# Patient Record
Sex: MALE | URBAN - METROPOLITAN AREA
[De-identification: names, ages, dates, MRNs, and addresses within clinical notes are randomized per-mention and may not be internally consistent; named-entity substitution may affect disease eponyms.]

---

## 2020-03-01 ENCOUNTER — HOSPITAL ENCOUNTER (INPATIENT)
Age: 20
LOS: 4 days | Discharge: HOME | DRG: 885 | End: 2020-03-05
Attending: PSYCHIATRY & NEUROLOGY | Admitting: PSYCHIATRY & NEUROLOGY
Payer: COMMERCIAL

## 2020-03-01 PROBLEM — F31.60 BIPOLAR AFFECTIVE DISORDER, CURRENT EPISODE MIXED (HCC): Status: ACTIVE | Noted: 2020-03-01

## 2020-03-01 PROBLEM — F23 ACUTE PSYCHOSIS (HCC): Status: ACTIVE | Noted: 2020-03-01

## 2020-03-01 PROCEDURE — 6370000000 HC RX 637 (ALT 250 FOR IP): Performed by: NURSE PRACTITIONER

## 2020-03-01 PROCEDURE — 6370000000 HC RX 637 (ALT 250 FOR IP): Performed by: PSYCHIATRY & NEUROLOGY

## 2020-03-01 PROCEDURE — 90792 PSYCH DIAG EVAL W/MED SRVCS: CPT | Performed by: NURSE PRACTITIONER

## 2020-03-01 PROCEDURE — 6360000002 HC RX W HCPCS: Performed by: NURSE PRACTITIONER

## 2020-03-01 PROCEDURE — 1240000000 HC EMOTIONAL WELLNESS R&B

## 2020-03-01 RX ORDER — TRAZODONE HYDROCHLORIDE 50 MG/1
50 TABLET ORAL NIGHTLY PRN
Status: DISCONTINUED | OUTPATIENT
Start: 2020-03-01 | End: 2020-03-05 | Stop reason: HOSPADM

## 2020-03-01 RX ORDER — HALOPERIDOL 5 MG/ML
5 INJECTION INTRAMUSCULAR EVERY 6 HOURS PRN
Status: DISCONTINUED | OUTPATIENT
Start: 2020-03-01 | End: 2020-03-02

## 2020-03-01 RX ORDER — MAGNESIUM HYDROXIDE/ALUMINUM HYDROXICE/SIMETHICONE 120; 1200; 1200 MG/30ML; MG/30ML; MG/30ML
30 SUSPENSION ORAL EVERY 6 HOURS PRN
Status: DISCONTINUED | OUTPATIENT
Start: 2020-03-01 | End: 2020-03-05 | Stop reason: HOSPADM

## 2020-03-01 RX ORDER — DIPHENHYDRAMINE HYDROCHLORIDE 50 MG/ML
25 INJECTION INTRAMUSCULAR; INTRAVENOUS EVERY 6 HOURS PRN
Status: DISCONTINUED | OUTPATIENT
Start: 2020-03-01 | End: 2020-03-02

## 2020-03-01 RX ORDER — HALOPERIDOL 10 MG/1
5 TABLET ORAL EVERY 6 HOURS PRN
Status: DISCONTINUED | OUTPATIENT
Start: 2020-03-01 | End: 2020-03-05 | Stop reason: HOSPADM

## 2020-03-01 RX ORDER — ARIPIPRAZOLE 5 MG/1
5 TABLET ORAL DAILY
Status: DISCONTINUED | OUTPATIENT
Start: 2020-03-01 | End: 2020-03-02

## 2020-03-01 RX ORDER — ACETAMINOPHEN 325 MG/1
650 TABLET ORAL EVERY 4 HOURS PRN
Status: DISCONTINUED | OUTPATIENT
Start: 2020-03-01 | End: 2020-03-05 | Stop reason: HOSPADM

## 2020-03-01 RX ORDER — BENZTROPINE MESYLATE 1 MG/ML
2 INJECTION INTRAMUSCULAR; INTRAVENOUS 2 TIMES DAILY PRN
Status: DISCONTINUED | OUTPATIENT
Start: 2020-03-01 | End: 2020-03-05 | Stop reason: HOSPADM

## 2020-03-01 RX ORDER — HYDROXYZINE HYDROCHLORIDE 10 MG/1
10 TABLET, FILM COATED ORAL 3 TIMES DAILY PRN
Status: DISCONTINUED | OUTPATIENT
Start: 2020-03-01 | End: 2020-03-05 | Stop reason: HOSPADM

## 2020-03-01 RX ORDER — NICOTINE 21 MG/24HR
1 PATCH, TRANSDERMAL 24 HOURS TRANSDERMAL DAILY
Status: DISCONTINUED | OUTPATIENT
Start: 2020-03-01 | End: 2020-03-01

## 2020-03-01 RX ADMIN — ARIPIPRAZOLE 5 MG: 5 TABLET ORAL at 10:57

## 2020-03-01 RX ADMIN — HYDROXYZINE HYDROCHLORIDE 10 MG: 10 TABLET ORAL at 21:07

## 2020-03-01 RX ADMIN — DIPHENHYDRAMINE HYDROCHLORIDE 25 MG: 50 INJECTION INTRAMUSCULAR; INTRAVENOUS at 21:06

## 2020-03-01 RX ADMIN — HALOPERIDOL LACTATE 5 MG: 5 INJECTION INTRAMUSCULAR at 21:06

## 2020-03-01 RX ADMIN — TRAZODONE HYDROCHLORIDE 50 MG: 50 TABLET ORAL at 21:07

## 2020-03-01 ASSESSMENT — SLEEP AND FATIGUE QUESTIONNAIRES
AVERAGE NUMBER OF SLEEP HOURS: 8
DO YOU HAVE DIFFICULTY SLEEPING: NO
DO YOU USE A SLEEP AID: NO

## 2020-03-01 ASSESSMENT — PATIENT HEALTH QUESTIONNAIRE - PHQ9: SUM OF ALL RESPONSES TO PHQ QUESTIONS 1-9: 8

## 2020-03-01 ASSESSMENT — LIFESTYLE VARIABLES
HISTORY_ALCOHOL_USE: NO
HISTORY_ALCOHOL_USE: NO

## 2020-03-01 ASSESSMENT — PAIN SCALES - GENERAL: PAINLEVEL_OUTOF10: 0

## 2020-03-01 NOTE — GROUP NOTE
Group Therapy Note    Date: 3/1/2020    Group Start Time: 0900  Group End Time: 0930  Group Topic: Community Meeting    [facility]      Patient declined to attend 0900 group at [employer] meeting/goal setting despite encouragement from staff. 1:1 talk time offered by staff as alternative to group session.       Signature:  Neeta Villalta

## 2020-03-02 PROCEDURE — 6370000000 HC RX 637 (ALT 250 FOR IP): Performed by: PSYCHIATRY & NEUROLOGY

## 2020-03-02 PROCEDURE — 6360000002 HC RX W HCPCS: Performed by: NURSE PRACTITIONER

## 2020-03-02 PROCEDURE — 99232 SBSQ HOSP IP/OBS MODERATE 35: CPT | Performed by: PSYCHIATRY & NEUROLOGY

## 2020-03-02 PROCEDURE — 1240000000 HC EMOTIONAL WELLNESS R&B

## 2020-03-02 PROCEDURE — 6370000000 HC RX 637 (ALT 250 FOR IP): Performed by: NURSE PRACTITIONER

## 2020-03-02 RX ORDER — LORAZEPAM 1 MG/1
1 TABLET ORAL 3 TIMES DAILY
Status: DISCONTINUED | OUTPATIENT
Start: 2020-03-02 | End: 2020-03-05 | Stop reason: HOSPADM

## 2020-03-02 RX ORDER — ARIPIPRAZOLE 10 MG/1
10 TABLET ORAL DAILY
Status: DISCONTINUED | OUTPATIENT
Start: 2020-03-03 | End: 2020-03-05 | Stop reason: HOSPADM

## 2020-03-02 RX ORDER — HALOPERIDOL 5 MG/ML
5 INJECTION INTRAMUSCULAR EVERY 6 HOURS PRN
Status: DISCONTINUED | OUTPATIENT
Start: 2020-03-02 | End: 2020-03-05 | Stop reason: HOSPADM

## 2020-03-02 RX ORDER — DIPHENHYDRAMINE HYDROCHLORIDE 50 MG/ML
25 INJECTION INTRAMUSCULAR; INTRAVENOUS EVERY 6 HOURS PRN
Status: DISCONTINUED | OUTPATIENT
Start: 2020-03-02 | End: 2020-03-05 | Stop reason: HOSPADM

## 2020-03-02 RX ADMIN — LORAZEPAM 1 MG: 1 TABLET ORAL at 21:18

## 2020-03-02 RX ADMIN — HYDROXYZINE HYDROCHLORIDE 10 MG: 10 TABLET ORAL at 21:18

## 2020-03-02 RX ADMIN — HALOPERIDOL 5 MG: 10 TABLET ORAL at 09:23

## 2020-03-02 RX ADMIN — LORAZEPAM 1 MG: 1 TABLET ORAL at 15:30

## 2020-03-02 RX ADMIN — ARIPIPRAZOLE 5 MG: 5 TABLET ORAL at 08:37

## 2020-03-02 RX ADMIN — HALOPERIDOL LACTATE 5 MG: 5 INJECTION INTRAMUSCULAR at 16:08

## 2020-03-02 RX ADMIN — DIPHENHYDRAMINE HYDROCHLORIDE 25 MG: 50 INJECTION INTRAMUSCULAR; INTRAVENOUS at 16:08

## 2020-03-02 RX ADMIN — TRAZODONE HYDROCHLORIDE 50 MG: 50 TABLET ORAL at 21:17

## 2020-03-02 ASSESSMENT — PAIN SCALES - GENERAL: PAINLEVEL_OUTOF10: 0

## 2020-03-02 NOTE — GROUP NOTE
Group Therapy Note    Date: 3/2/2020    Group Start Time: 1330  Group End Time: 0876  Group Topic: Recreational    [address], [place]        Group Therapy Note    Attendees: 7/15    patient refused to attend leisure awareness group at [phone] after encouragement from staff. 1:1 talk time provided as alternative to group session .            Signature:  Marlen [place]

## 2020-03-02 NOTE — GROUP NOTE
Group Therapy Note    Date: 3/1/2020    Group Start Time: 2100  Group End Time: 2130  Group Topic: Wrap-Up    [facility] JAYDE Farley        Group Therapy Note    Attendees: 11/14 patients for a \"time capsule\" activity goal group     Patient was in and out of group but did answer one question appropriately    Signature:  Tam Sandoval

## 2020-03-02 NOTE — GROUP NOTE
Group Therapy Note    Date: 3/2/2020    Group Start Time: 0900  Group End Time: 0920  Group Topic: Community Meeting    [address] [place] [address]        Group Therapy Note    Attendees: 10/16         Patient's Goal:  To develop a daily goal    Status After Intervention:  Improved    Participation Level:  Active Listener and Interactive    Participation Quality: Appropriate and Attentive      Speech:  normal      Thought Process/Content: Logical      Affective Functioning: Congruent      Mood: euphoric    Level of consciousness:  Alert and Oriented x4    Response to Learning: Able to verbalize current knowledge/experience and Able to verbalize/acknowledge new learning      Modes of Intervention: Education, Support and Socialization      Discipline Responsible: Psychoeducational Specialist      Signature:  Lilly Deshpande

## 2020-03-03 PROCEDURE — 6370000000 HC RX 637 (ALT 250 FOR IP): Performed by: PSYCHIATRY & NEUROLOGY

## 2020-03-03 PROCEDURE — 99232 SBSQ HOSP IP/OBS MODERATE 35: CPT | Performed by: PSYCHIATRY & NEUROLOGY

## 2020-03-03 PROCEDURE — 6360000002 HC RX W HCPCS: Performed by: NURSE PRACTITIONER

## 2020-03-03 PROCEDURE — 1240000000 HC EMOTIONAL WELLNESS R&B

## 2020-03-03 RX ORDER — PROPRANOLOL HYDROCHLORIDE 20 MG/1
20 TABLET ORAL 3 TIMES DAILY
Status: DISCONTINUED | OUTPATIENT
Start: 2020-03-03 | End: 2020-03-05 | Stop reason: HOSPADM

## 2020-03-03 RX ADMIN — PROPRANOLOL HYDROCHLORIDE 20 MG: 20 TABLET ORAL at 20:49

## 2020-03-03 RX ADMIN — LORAZEPAM 1 MG: 1 TABLET ORAL at 12:40

## 2020-03-03 RX ADMIN — PROPRANOLOL HYDROCHLORIDE 20 MG: 20 TABLET ORAL at 12:40

## 2020-03-03 RX ADMIN — LORAZEPAM 1 MG: 1 TABLET ORAL at 20:50

## 2020-03-03 RX ADMIN — BENZTROPINE MESYLATE 2 MG: 1 INJECTION INTRAMUSCULAR; INTRAVENOUS at 12:39

## 2020-03-03 RX ADMIN — ARIPIPRAZOLE 10 MG: 10 TABLET ORAL at 09:16

## 2020-03-03 RX ADMIN — TRAZODONE HYDROCHLORIDE 50 MG: 50 TABLET ORAL at 22:26

## 2020-03-03 RX ADMIN — LORAZEPAM 1 MG: 1 TABLET ORAL at 09:16

## 2020-03-03 RX ADMIN — NICOTINE POLACRILEX 2 MG: 2 GUM, CHEWING BUCCAL at 20:48

## 2020-03-03 NOTE — GROUP NOTE
Group Therapy Note    Date: 3/3/2020    Group Start Time: 1330  Group End Time: 1400  Group Topic: Cognitive Skills    [facility] MARVA Alberto        Group Therapy Note    Attendees: 5/15         Patient's Goal:  Developing a daily schedule and introducing new coping skills to maintain wellness    Status After Intervention:  Improved    Participation Level:  Active Listener and Interactive    Participation Quality: Appropriate and Attentive      Speech:  normal      Thought Process/Content: Logical      Affective Functioning: Congruent      Mood: euphoric      Level of consciousness:  Alert, Oriented x4 and Attentive      Response to Learning: Able to verbalize current knowledge/experience, Able to verbalize/acknowledge new learning and Able to retain information      Modes of Intervention: Education, Support and Socialization      Discipline Responsible: Psychoeducational Specialist      Signature:  Kip Coleman

## 2020-03-03 NOTE — GROUP NOTE
Group Therapy Note    Date: 3/3/2020    Group Start Time: 1600  Group End Time: 36  Group Topic: Healthy Living/Wellness    KAREN Nicholas        Group Therapy Note    Attendees: 8         Patient's Goal: Socialization          Status After Intervention:  Improved    Participation Level:  Active Listener    Participation Quality: Appropriate      Speech:  normal      Thought Process/Content: Logical      Affective Functioning: Flat      Mood: euthymic      Level of consciousness:  Alert and Oriented x4      Response to Learning: Able to verbalize current knowledge/experience      Endings: None Reported    Modes of Intervention: Socialization      Discipline Responsible: Behavorial Health Tech      Signature:  Jennifer Tang

## 2020-03-03 NOTE — PLAN OF CARE
Patient is alert and oriented, able to make needs known. Patient denies suicidal thoughts, denies thoughts of self harm or harm to others. Patient denies depression, admits to anxiety. Patient asking for something to help with anxiety and agitation. Patient appears to be preoccupied, slow to respond.  Patient is medication complaint
Problem: Altered Mood, Psychotic Behavior:  Goal: Able to verbalize decrease in frequency and intensity of hallucinations  Description  Able to verbalize decrease in frequency and intensity of hallucinations  3/3/2020 0025 by Rayray Eddy LPN  Outcome: Ongoing  Note:   Patient denies hallucinations at this time. Problem: Altered Mood, Psychotic Behavior:  Goal: Able to verbalize reality based thinking  Description  Able to verbalize reality based thinking  3/3/2020 0025 by Rayray Eddy LPN  Outcome: Ongoing  Note:   Patient verbalized reality based thinking.
Problem: Altered Mood, Psychotic Behavior:  Goal: Able to verbalize decrease in frequency and intensity of hallucinations  Description  Able to verbalize decrease in frequency and intensity of hallucinations  3/3/2020 1054 by Emile Moss RN  Outcome: Ongoing     Problem: Altered Mood, Psychotic Behavior:  Goal: Able to verbalize reality based thinking  Description  Able to verbalize reality based thinking  3/3/2020 1054 by Emile Moss RN  Outcome: Ongoing     Patient denies suicidal and homicidal ideations. Patient denies hallucinations, but appears to be responding to internal stimuli at times. Patient is compliant with medications and in behavioral control. Patient is cooperative and appropriate during assessment. Will continue safety checks per provider order.
Problem: Altered Mood, Psychotic Behavior:  Goal: Able to verbalize reality based thinking  Description  Able to verbalize reality based thinking  3/1/2020 2237 by Mathew Hidalgo RN  Outcome: Ongoing  Patient states they are not having thoughts of self harm at this time and verbally agrees to seek staff if feelings of harming self arise,patient reported anxiety racing thoughts,PRN meds given pt tolerated well. Patient behavior controlled and accepting of medications,denies audio hallucination and visual hallucinationsRestless,pacing,hyper verbal,poor sleep,patient eating well. Staff will continue to monitor for safety and offer support as needed.
Insight  Present Suicidal Ideation: No    EDUCATION:   Learner Progress Toward Treatment Goals: reviewed group plans and strategies for care    Method:group therapy, medication compliance, individualized assessments and care planning    Outcome: needs reinforcement    PATIENT GOALS: to be discussed with patient within 72 hours    PLAN/TREATMENT RECOMMENDATIONS:     continue group therapy , medications compliance, goal setting, individualized assessments and care, continue to monitor pt on unit      SHORT-TERM GOALS:   Time frame for Short-Term Goals: 5-7 days    LONG-TERM GOALS:  Time frame for Long-Term Goals: 6 months  Members Present in Team Meeting: See Signature [address]
[facility]

## 2020-03-03 NOTE — PROGRESS NOTES
Nightly PRN  hydrOXYzine (ATARAX) tablet 10 mg, 10 mg, Oral, TID PRN  haloperidol (HALDOL) tablet 5 mg, 5 mg, Oral, Q6H PRN  acetaminophen (TYLENOL) tablet 650 mg, 650 mg, Oral, Q4H PRN  benztropine mesylate (COGENTIN) injection 2 mg, 2 mg, Intramuscular, BID PRN  magnesium hydroxide (MILK OF MAGNESIA) 400 MG/5ML suspension 30 mL, 30 mL, Oral, Daily PRN  aluminum & magnesium hydroxide-simethicone (MAALOX) 200-200-20 MG/5ML suspension 30 mL, 30 mL, Oral, Q6H PRN    No results found for this or any previous visit (from the past 72 hour(s)). ASSESSMENT     Active Problems:    Bipolar affective disorder, current episode mixed ([facility] [address]  Resolved Problems:    * No resolved hospital problems. *      PLAN    · The patient has significant psychosis and during his ability to function outside  The hospital. the catatonic symptoms are most likely due to the psychosis as he was not on treatment prior to admission. .  · Increase Abilify to 10 mg daily. · Schedule Ativan 1 mg 3 times a day for the catatonic symptoms and severe anxiety. · Continue unit milieu and group psychotherapy.     Electronically Signed by Italia Hodge MD , 3/2/2020 8:42 PM

## 2020-03-03 NOTE — GROUP NOTE
Group Therapy Note    Date: 3/3/2020    Group Start Time: 2050  Group End Time: 2115  Group Topic: Recreational    [facility] ROSALBA Storm RN        Group Therapy Note    Attendees: 9/13         Patient attended PM recreational/wrap up group. Signature:   Star Storm RN

## 2020-03-04 PROCEDURE — 6370000000 HC RX 637 (ALT 250 FOR IP): Performed by: PSYCHIATRY & NEUROLOGY

## 2020-03-04 PROCEDURE — 1240000000 HC EMOTIONAL WELLNESS R&B

## 2020-03-04 PROCEDURE — 99232 SBSQ HOSP IP/OBS MODERATE 35: CPT | Performed by: PSYCHIATRY & NEUROLOGY

## 2020-03-04 RX ADMIN — NICOTINE POLACRILEX 2 MG: 2 GUM, CHEWING BUCCAL at 18:47

## 2020-03-04 RX ADMIN — LORAZEPAM 1 MG: 1 TABLET ORAL at 20:34

## 2020-03-04 RX ADMIN — ARIPIPRAZOLE 10 MG: 10 TABLET ORAL at 08:29

## 2020-03-04 RX ADMIN — PROPRANOLOL HYDROCHLORIDE 20 MG: 20 TABLET ORAL at 20:33

## 2020-03-04 RX ADMIN — LORAZEPAM 1 MG: 1 TABLET ORAL at 08:29

## 2020-03-04 RX ADMIN — LORAZEPAM 1 MG: 1 TABLET ORAL at 14:14

## 2020-03-04 RX ADMIN — TRAZODONE HYDROCHLORIDE 50 MG: 50 TABLET ORAL at 20:34

## 2020-03-04 RX ADMIN — NICOTINE POLACRILEX 2 MG: 2 GUM, CHEWING BUCCAL at 09:56

## 2020-03-04 RX ADMIN — PROPRANOLOL HYDROCHLORIDE 20 MG: 20 TABLET ORAL at 08:29

## 2020-03-04 RX ADMIN — PROPRANOLOL HYDROCHLORIDE 20 MG: 20 TABLET ORAL at 14:14

## 2020-03-04 ASSESSMENT — PAIN SCALES - GENERAL: PAINLEVEL_OUTOF10: 0

## 2020-03-04 NOTE — H&P
status: Current Some Day Smoker     Types: Cigarettes    Smokeless tobacco: Never Used   Substance and Sexual Activity    Alcohol use: Not Currently    Drug use: Yes     Types: Marijuana    Sexual activity: Not Currently   Lifestyle    Physical activity:     Days per week: None     Minutes per session: None    Stress: None   Relationships    Social connections:     Talks on phone: None     Gets together: None     Attends Yazdanism service: None     Active member of club or organization: None     Attends meetings of clubs or organizations: None     Relationship status: None    Intimate partner violence:     Fear of current or ex partner: None     Emotionally abused: None     Physically abused: None     Forced sexual activity: None   Other Topics Concern    None   Social History Narrative    None         REVIEW OF SYSTEMS      No Known Allergies    No current facility-administered medications on file prior to encounter. No current outpatient medications on file prior to encounter. General health:  Fairly good. No fever or chills. Skin:  No itching, redness or rash. Head, eyes, ears, nose, throat:  No headache, epistaxis, rhinorrhea hearing loss or sore throat. Neck:  No pain, stiffness or masses. Cardiovascular/Respiratory system:  No chest pain, palpitation, shortness of breath, coughing or expectoration. Gastrointestinal tract: No abdominal pain, nausea, vomiting, dysphagia, diarrhea or constipation. Genitourinary:  No burning on micturition. No hesitancy, urgency, frequency or discoloration of urine. Locomotor:  No bone or joint pains. No swelling or deformities. Neuropsychiatric:  See HPI. GENERAL PHYSICAL EXAM:     Vitals: /78   Pulse 80   Temp 98.5 °F (36.9 °C) (Oral)   Resp 14   Ht 5' 11\" (1.803 m)   Wt 185 lb (83.9 kg)   BMI 25.80 kg/m²  Body mass index is 25.8 kg/m².      Pt was examined with a nurse present in the room. GENERAL APPEARANCE:  Young Listen is 22 y.o.,  male, not obese, nourished, conscious, alert. Does not appear to be in any acute distress or pain at this time. SKIN:  Warm, dry, no cyanosis or jaundice. HEAD:  Normocephalic, atraumatic, no swelling or tenderness. EYES:  Wears glasses. Pupils equal, reactive to light, conjunctiva is clear, EOMs intact aleksey. eyelids WNL. EARS:  No discharge, no marked hearing loss. NOSE:  No rhinorrhea, epistaxis or septal deformity. THROAT:  Poor dentition. Uvula midline. No ulceration bleeding or discharge. NECK:  No stiffness, trachea central.  No palpable masses. CHEST:  Symmetrical and equal on expansion. HEART:  Regular rate and rhythm. S1 > S2, No audible murmurs or gallops. LUNGS:  Equal on expansion, normal breath sounds. No adventitious sounds. ABDOMEN:  NABS x 4 quads. Soft on palpation. No localized tenderness. No guarding or rigidity. LYMPHATICS:  No palpable anterior cervical lymphadenopathy. LOCOMOTOR, BACK AND SPINE:  No tenderness or deformities. EXTREMITIES:  Symmetrical, no pretibial edema. No calf tenderness. No discoloration or ulcerations. NEUROLOGIC:  The patient is conscious, alert, oriented,cranial nerve II-XII intact, taste and smell were not examined. No apparent focal sensory or motor deficits. Muscle strength equal Aleksey. No facial droop, tongue protrudes centrally, no slurring of the speech. PROVISIONAL DIAGNOSES:      Active Problems:    Bipolar affective disorder, current episode mixed ([address]  Resolved Problems:    * No resolved hospital problems.  BETZY Pelletier - CNP on 3/4/2020 at 2:34 PM

## 2020-03-04 NOTE — PROGRESS NOTES
polacrilex (NICORETTE) gum 2 mg, 2 mg, Oral, PRN  traZODone (DESYREL) tablet 50 mg, 50 mg, Oral, Nightly PRN  hydrOXYzine (ATARAX) tablet 10 mg, 10 mg, Oral, TID PRN  haloperidol (HALDOL) tablet 5 mg, 5 mg, Oral, Q6H PRN  acetaminophen (TYLENOL) tablet 650 mg, 650 mg, Oral, Q4H PRN  benztropine mesylate (COGENTIN) injection 2 mg, 2 mg, Intramuscular, BID PRN  magnesium hydroxide (MILK OF MAGNESIA) 400 MG/5ML suspension 30 mL, 30 mL, Oral, Daily PRN  aluminum & magnesium hydroxide-simethicone (MAALOX) 200-200-20 MG/5ML suspension 30 mL, 30 mL, Oral, Q6H PRN    No results found for this or any previous visit (from the past 72 hour(s)). ASSESSMENT     Active Problems:    Bipolar affective disorder, current episode mixed ([facility] [address]  Resolved Problems:    * No resolved hospital problems. *      PLAN    · The patient continues to have psychosis and symptoms of catatonia/extrapyramidal side effects. · Start propranolol for possible akathisia. · We will administer Cogentin for the dystonia. · continue Abilify 10 mg daily. · Schedule Ativan 1 mg 3 times a day for the catatonic symptoms and severe anxiety. · Continue unit milieu and group psychotherapy.     Electronically Signed by Amrita Calvo MD , 3/3/2020 7:57 PM

## 2020-03-04 NOTE — PROGRESS NOTES
Department of Psychiatry  Attending Physician Progress Note    Chief Complaint: psychosis    SUBJECTIVE:     The patient was feeling less anxious, and was less restless. The delusions and hallucinations are dissipating, he realized he was paranoid and was preoccupied with thoughts about God and lucifer. The spasticity in his neck/back muscles/ dystonia resolved, no more akathesia. He was better but not quite ready for discharge as his thinking was not totally clear yet. Sleep, energy and appetite were impaired. No suicidal ideations. There was no major side effects. There is no safe alternative other than the hospital treatment at this time. OBJECTIVE    Physical  VITALS:    /78   Pulse 80   Temp 98.5 °F (36.9 °C) (Oral)   Resp 14   Ht 5' 11\" (1.803 m)   Wt 185 lb (83.9 kg)   BMI 25.80 kg/m²     Mental Status Examination:    Level of consciousness:  Within normal limits  Appearance: Street clothes, seated, with good grooming  Behavior/Motor: WNL  Attitude toward examiner:  Cooperative, attentive, good eye contact  Speech:  spontaneous, variable rate, low volume and well articulated  Mood:  Less Depressed, anxious  Affect:  Mood-congruent, constricted in range. Thought processes: Thought blocking was less. Thought content:  denies homicidal ideation  Suicidal Ideation:  No suicidal ideation  Delusions:   No paranoid delusions  Perceptual Disturbance:  less auditory hallucinations. Cognition:  Intact  Memory: grossly intact.   Insight & Judgement: partial       Medications  Current Facility-Administered Medications: propranolol (INDERAL) tablet 20 mg, 20 mg, Oral, TID  LORazepam (ATIVAN) tablet 1 mg, 1 mg, Oral, TID  haloperidol lactate (HALDOL) injection 5 mg, 5 mg, Intramuscular, Q6H PRN **AND** diphenhydrAMINE (BENADRYL) injection 25 mg, 25 mg, Intramuscular, Q6H PRN  ARIPiprazole (ABILIFY) tablet 10 mg, 10 mg, Oral, Daily  nicotine polacrilex (NICORETTE) gum 2 mg, 2 mg, Oral,

## 2020-03-04 NOTE — FLOWSHEET NOTE
*Patient participated in the [address] Service       03/04/20 1529   Encounter Summary   Services provided to: Patient   Referral/Consult From: Rounding   Continue Visiting   (3/4/20)   Complexity of Encounter Moderate   Length of Encounter 30 minutes   Spiritual Assessment Completed Yes   Spiritual/Shinto   Type Spiritual support   Assessment Calm; Approachable   Intervention Active listening   Outcome Receptive

## 2020-03-04 NOTE — PROGRESS NOTES
Pharmacy Med Education Group Note    Date: 3/4/20  Start Time: 7508  End Time: 1457    Number Participants in Group:  8    Goal:  Patient will demonstrate an understanding of the medications intended purpose and possible adverse effects  Topic: [place] for Pharmacy Med Ed Group    Discipline Responsible:     OT  AT  [facility].  RT     X Other       Participation Level:     None  Minimal      X Active Listener    X Interactive    Monopolizing         Participation Quality:    X Appropriate  Inappropriate     X       Attentive        Intrusive          Sharing        Resistant          Supportive        Lethargic       Affective:     X Congruent  Incongruent  Blunted  Flat    Constricted  Anxious  Elated  Angry    Labile  Depressed  Other         Cognitive:    X Alert  Oriented PPTP     Concentration   X G  F  P   Attention Span   X G  F  P   Short-Term Memory   X G  F  P   Long-Term Memory  G  F  P   ProblemSolving/  Decision Making  G  F  P   Ability to Process  Information   X G  F  P      Contributing Factors             Delusional             Hallucinating             Flight of Ideas             Other:       Modes of Intervention:    X Education   X Support  Exploration    Clarifying  Problem Solving  Confrontation    Socialization  Limit Setting  Reality Testing    Activity  Movement  Media    Other:            Response to Learning:    X Able to verbalize current knowledge/experience    Able to verbalize/acknowledge new learning    Able to retain information    Capable of insight    Able to change behavior    Progressing to goal    Other:        Comments:     Christel Hilario,PharmD,  3/4/2020, 4:51 PM

## 2020-03-04 NOTE — GROUP NOTE
Group Therapy Note    Date: 3/4/2020    Group Start Time: 1100  Group End Time: 3438  Group Topic: Psychotherapy    [facility]    Tenisha Simpson        Group Therapy Note    Attendees: 5/8         Patient's Goal:  PT will demonstrate increased interpersonal interaction and a clear understanding on multiple types of coping skills relating to the here-and-now therapeutic practice. Notes:  : PT was an active listener during group discussion on this date. Status After Intervention:  Unchanged    Participation Level: Active Listener    Participation Quality: Appropriate, Attentive and Sharing      Speech:  normal      Thought Process/Content: Logical      Affective Functioning: Congruent      Mood: stable       Level of consciousness:  Alert, Oriented x4 and Attentive      Response to Learning: Able to verbalize current knowledge/experience and Progressing to goal      Endings: None Reported    Modes of Intervention: Support, Socialization, Exploration, Clarifying and Problem-solving      Discipline Responsible: /Counselor      Signature:   Tenisha Simspon

## 2020-03-05 VITALS
RESPIRATION RATE: 14 BRPM | TEMPERATURE: 97.9 F | DIASTOLIC BLOOD PRESSURE: 76 MMHG | HEART RATE: 88 BPM | SYSTOLIC BLOOD PRESSURE: 137 MMHG | BODY MASS INDEX: 25.9 KG/M2 | HEIGHT: 71 IN | WEIGHT: 185 LBS

## 2020-03-05 PROCEDURE — 5130000000 HC BRIDGE APPOINTMENT: Performed by: COUNSELOR

## 2020-03-05 PROCEDURE — 6370000000 HC RX 637 (ALT 250 FOR IP): Performed by: PSYCHIATRY & NEUROLOGY

## 2020-03-05 PROCEDURE — 99238 HOSP IP/OBS DSCHRG MGMT 30/<: CPT | Performed by: PSYCHIATRY & NEUROLOGY

## 2020-03-05 PROCEDURE — 6370000000 HC RX 637 (ALT 250 FOR IP): Performed by: NURSE PRACTITIONER

## 2020-03-05 RX ORDER — HYDROXYZINE HYDROCHLORIDE 10 MG/1
10 TABLET, FILM COATED ORAL 3 TIMES DAILY PRN
Qty: 10 TABLET | Refills: 0 | Status: SHIPPED | OUTPATIENT
Start: 2020-03-05 | End: 2020-03-15

## 2020-03-05 RX ORDER — PROPRANOLOL HYDROCHLORIDE 20 MG/1
20 TABLET ORAL 3 TIMES DAILY
Qty: 30 TABLET | Refills: 0 | Status: SHIPPED | OUTPATIENT
Start: 2020-03-05

## 2020-03-05 RX ORDER — TRAZODONE HYDROCHLORIDE 50 MG/1
50 TABLET ORAL NIGHTLY PRN
Qty: 30 TABLET | Refills: 0 | Status: SHIPPED | OUTPATIENT
Start: 2020-03-05

## 2020-03-05 RX ORDER — ARIPIPRAZOLE 10 MG/1
10 TABLET ORAL DAILY
Qty: 30 TABLET | Refills: 0 | Status: SHIPPED | OUTPATIENT
Start: 2020-03-06

## 2020-03-05 RX ADMIN — LORAZEPAM 1 MG: 1 TABLET ORAL at 07:53

## 2020-03-05 RX ADMIN — PROPRANOLOL HYDROCHLORIDE 20 MG: 20 TABLET ORAL at 07:53

## 2020-03-05 RX ADMIN — ARIPIPRAZOLE 10 MG: 10 TABLET ORAL at 07:53

## 2020-03-05 RX ADMIN — HYDROXYZINE HYDROCHLORIDE 10 MG: 10 TABLET ORAL at 09:27

## 2020-03-05 NOTE — GROUP NOTE
Group Therapy Note    Date: 3/5/2020    Group Start Time: 0900  Group End Time: 9201  Group Topic: Community Meeting    MARVA Caldwell    Group Therapy Note    Attendees: 8      Patient's Goal:  Pt. will identify a daily goal and demonstrate understanding of unit guidelines. Notes:  Pt. attended and participated in group. Status After Intervention:  Improved    Participation Level:  Active Listener and Interactive    Participation Quality: Appropriate, Attentive and Sharing      Speech:  normal      Thought Process/Content: Logical  Linear      Affective Functioning: Congruent      Mood: euphoric and euthymic      Level of consciousness:  Alert, Oriented x4 and Attentive      Response to Learning: Able to verbalize current knowledge/experience, Able to verbalize/acknowledge new learning, Able to retain information and Capable of insight      Endings: None Reported    Modes of Intervention: Education, Exploration and Limit-setting      Discipline Responsible: Psychoeducational Specialist      Signature:  Julio Grewal

## 2020-03-05 NOTE — PROGRESS NOTES
CLINICAL PHARMACY NOTE: MEDS TO [address] Select Patient?: No  Total # of Prescriptions Filled: 4   The following medications were delivered to the patient:  · Trazodone  · Propanol  · Hydroxyzine  · Abilify  ·   Total # of Interventions Completed: 0  Time Spent (min): 30    Additional Documentation:

## 2020-03-05 NOTE — DISCHARGE SUMMARY
Patient ID:  Maday Shrestha  [id number]  31 y.o.  2000    Admit date: 3/1/2020    Discharge date and time: 3/5/2020  11:01 AM     Admitting Physician: Frederick Fournier MD     Discharge Physician: Cassy Campos MD    Admission Diagnoses: Acute psychosis [facility]) [F23]    Discharge Diagnoses:   Bipolar affective disorder, current episode mixed ([facility] [address] rule out psychosis secondary to cannabis  Cannabis use disorder    Patient Active Problem List   Diagnosis Code    [facility] F[facility]    Bipolar affective disorder, current episode mixed ([facility] [address] F31.60        Admission Condition: poor    Discharged Condition: stable    Indication for Admission: threat to self    History of Present Illnes (present tense wording indicates findings from admission exam on 3/1/2020 and are not necessarily indicative of current findings):   Maday Shrestha is a 22 y.o. male who was admitted from [facility] emergency department on a pink slip. The patient has not signed in on a voluntary basis. Patient presented to [facility] emergency department reporting that he had a bad acid trip approximately 4 months ago. He stated \"things have not been right since. \"  He is been having increase in auditory hallucinations. He reports that the voices will tell him to play fortnight, he believes demons are talking to him through the television. He reported suicidal thoughts with no specific plan. Bizarre notes were written to his ex-girlfriend with statements such as \"mommy cannot eat daddy,\" \"daddy cannot eat baby,\" and \"daddy cannot eat mommy. \"  He did report an increase in anxiety.       Since the patient arrived to the milieu he has been calm and cooperative. No PRN medications required. He denied any hallucinations at time of admission but nursing noted that the patient did appear to be responding to internal stimuli.   This morning he did approach the nurses station and when I asked him what his name was he had to look at his wrist band to remember his name. He has been on the phone very tearful and crying intermittently.     Patient is seen in his room today. He is very quiet. Lying in bed. When asked why he was here he states \"my daughter. \"  He was unable to provide additional information. He was bizarre and tearful. Intermittently whispering to himself. His whispers were unable to be made out by this writer. He was actively responding to internal stimuli. He did admit that he was having auditory hallucinations. He described them as \"mixed words. \"  He expresses that these are very frightening to him. He states that he tries to make meeting out of them but he is not able to. He denies any visual hallucinations and states \"no not quite yet. \"  He is very anxious and restless. There is no overt delusions noted. Patient does report that his mood has been very elevated. He has not slept in 3 to 4 days. He states that he has been having more energy and has not missed the sleep. He reports racing thoughts. Increasing anxiety. Does report more impulsive and risky behaviors. Very focused on \"I need to go home. \"  Very poor insight and judgment. This writer did suggest to take an as needed medication to help relax him and he states \"if I get any rest, I will remember me. \"  He denied suicidal thoughts. Denied homicidal thoughts. Given the patient's impaired insight and judgment, he remains at risk. He will remain hospitalized as level of care necessary for safety and stabilization is greater than that which can be provided on the outpatient basis. Hospital Course:   Upon admission, Balta Lofton was provided a safe secure environment, introduced to unit milieu. Patient participated in groups and individual therapies.  Meds were adjusted in the following way:   · Abilify 10 mg daily  · Ativan was used for symptomatic relief  · Propranolol for anxiety and akathisia       After few days of hospital care, patient began to feel improvement. Depression lifted, thoughts to harm self ceased. Sleep improved, appetite was good. On morning rounds 3/5/2020, patient endorsed feeling ready for discharge. Patient denies suicidal or homicidal ideations, denies hallucinations or delusions. Denies SE's from meds. It was decided that pt had achieved maximum benefit from hospital care and can be discharged. The patient said today that he is eager to see his daughter and his mother. Today was the second day of significant improvement in his symptoms. Consults:   None    Significant Diagnostic Studies: Routine labs and diagnostics    Treatments: Psychotropic medications, therapy with group, milieu, and 1:1 with nurses, social workers, TYRELL/CNP, and Attending physician.       Discharge Medications:  Current Discharge Medication List      START taking these medications    Details   hydrOXYzine (ATARAX) 10 MG tablet Take 1 tablet by mouth 3 times daily as needed for Anxiety  Qty: 10 tablet, Refills: 0      traZODone (DESYREL) 50 MG tablet Take 1 tablet by mouth nightly as needed for Sleep  Qty: 30 tablet, Refills: 0      ARIPiprazole (ABILIFY) 10 MG tablet Take 1 tablet by mouth daily  Qty: 30 tablet, Refills: 0      propranolol (INDERAL) 20 MG tablet Take 1 tablet by mouth 3 times daily  Qty: 30 tablet, Refills: 0                Core Measures statement:   Not applicable      Discharge Exam:  Level of consciousness:  Within normal limits  Appearance: Street clothes, seated, with good grooming  Behavior/Motor: No abnormalities noted  Attitude toward examiner:  Cooperative, attentive, good eye contact  Speech:  spontaneous, normal rate, normal volume and well articulated  Mood:  euthymic  Affect:  Mood-congruent with normal range  Thought processes:  linear, goal directed and coherent  Thought content:  No Homocidal ideation  Suicidal Ideation:  No suicidal ideation  Delusions:  no evidence of delusions  Perceptual Disturbance:  denies any perceptual disturbance  Cognition:  Intact  Memory: age appropriate  Insight & Judgement: fair  Medication side effects:  Denies any. Disposition: home    Patient Instructions: Activity: activity as tolerated    Follow-up as scheduled with psychiatric care within 1 week (see discharge papers)    Time Spent: 35 minutes    Engagement: Patient displayed a good level of engagement with the treatments offered during this admission.        Discharge planning, findings, and recommendations were discussed with and approved by Dr. Amelia Patricia MD    Signed:  Amelia Patricia   3/5/2020  11:01 AM

## 2020-03-05 NOTE — BH NOTE
Atarax prn given for anxiety.
Patient c/o generalized anxiety being increased, observed pacing the unit.  Provided prn atarax per patient request.
Patient given tobacco quitline number 77657284386 at this time, refusing to call at this time, states \" I just dont want to quit now\"- patient given information as to the dangers of long term tobacco use. Continue to reinforce the importance of tobacco cessation.
Patient participated in [address] this shift.
Psychiatric Admission Note    Sabino Plata is a 22 y.o. male who was admitted from [facility] emergency department on a pink slip. The patient has not signed in on a voluntary basis. Patient presented to [facility] emergency department reporting that he had a bad acid trip approximately 4 months ago. He stated \"things have not been right since. \"  He is been having increase in auditory hallucinations. He reports that the voices will tell him to play fortnight, he believes demons are talking to him through the television. He reported suicidal thoughts with no specific plan. Bizarre notes were written to his ex-girlfriend with statements such as \"mommy cannot eat daddy,\" \"daddy cannot eat baby,\" and \"daddy cannot eat mommy. \"  He did report an increase in anxiety. Since the patient arrived to the milieu he has been calm and cooperative. No PRN medications required. He denied any hallucinations at time of admission but nursing noted that the patient did appear to be responding to internal stimuli. This morning he did approach the nurses station and when I asked him what his name was he had to look at his wrist band to remember his name. He has been on the phone very tearful and crying intermittently. Patient is seen in his room today. He is very quiet. Lying in bed. When asked why he was here he states \"my daughter. \"  He was unable to provide additional information. He was bizarre and tearful. Intermittently whispering to himself. His whispers were unable to be made out by this writer. He was actively responding to internal stimuli. He did admit that he was having auditory hallucinations. He described them as \"mixed words. \"  He expresses that these are very frightening to him. He states that he tries to make meeting out of them but he is not able to. He denies any visual hallucinations and states \"no not quite yet. \"  He is very anxious and
Pt is agitated as evidence by pacing, fidgeting, rapid breathing, crying. Staff attempted to find and relieve the distress by talking to pt, refocusing on new activity, offering suggestions, administer PRN medications. Pt is currently continue to escalate, accepted PRN medications.
RN has reviewed all LPN's charting
patient refused to attend hygene   group at 1100 after encouragement from staff.   1:1 talk time provided as alternative to group session
Judgment: Poor Judgment, Poor Insight  Present Suicidal Ideation: No    Tobacco Screening:  Practical Counseling, on admission, guillermo X, if applicable and completed (first 3 are required if patient doesn't refuse):            ( )  Recognizing danger situations (included triggers and roadblocks)                    ( )  Coping skills (new ways to manage stress, exercise, relaxation techniques, changing routine, distraction)                                                           ( )  Basic information about quitting (benefits of quitting, techniques in how to quit, available resources  ( ) Referral for counseling faxed to [facility]                                           ( x) Patient refused counseling  ( ) Patient has not smoked in the last 30 days    Metabolic Screening:    No results found for: LABA1C    No results found for: CHOL  No results found for: TRIG  No results found for: HDL  No components found for: LDLCAL  No results found for: LABVLDL      Body mass index is 25.8 kg/m². BP Readings from Last 2 Encounters:   03/01/20 (!) 167/87           Pt admitted with followings belongings:  Dentures: None  Vision - Corrective Lenses: None  Hearing Aid: None  Jewelry: None  Body Piercings Removed: N/A  Clothing: Socks, Footwear, Jacket / coat, Pants, Shirt  Were All Patient Medications Collected?: Not Applicable  Other Valuables: None     Patient oriented to surroundings and program expectations and copy of patient rights given. Received admission packet:  yes. Consents reviewed, signed no patient refused. Patient verbalized understanding:  yes. Patient education on precautions: yes.                    Anjali Momin RN